# Patient Record
Sex: FEMALE | Race: WHITE | Employment: FULL TIME | URBAN - METROPOLITAN AREA
[De-identification: names, ages, dates, MRNs, and addresses within clinical notes are randomized per-mention and may not be internally consistent; named-entity substitution may affect disease eponyms.]

---

## 2021-05-27 ENCOUNTER — APPOINTMENT (OUTPATIENT)
Dept: GENERAL RADIOLOGY | Age: 36
End: 2021-05-27
Payer: COMMERCIAL

## 2021-05-27 ENCOUNTER — HOSPITAL ENCOUNTER (EMERGENCY)
Age: 36
Discharge: HOME | End: 2021-05-27
Payer: COMMERCIAL

## 2021-05-27 VITALS
SYSTOLIC BLOOD PRESSURE: 149 MMHG | HEIGHT: 63 IN | DIASTOLIC BLOOD PRESSURE: 91 MMHG | HEART RATE: 72 BPM | WEIGHT: 198 LBS | BODY MASS INDEX: 35.08 KG/M2 | RESPIRATION RATE: 17 BRPM | TEMPERATURE: 97.9 F | OXYGEN SATURATION: 100 %

## 2021-05-27 DIAGNOSIS — R20.2 PARESTHESIAS: Primary | ICD-10-CM

## 2021-05-27 DIAGNOSIS — R20.0 NUMBNESS: ICD-10-CM

## 2021-05-27 LAB
ANION GAP SERPL CALCULATED.3IONS-SCNC: 8 MMOL/L (ref 3–16)
BASOPHILS ABSOLUTE: 0.1 K/UL (ref 0–0.2)
BASOPHILS RELATIVE PERCENT: 0.8 %
BUN BLDV-MCNC: 10 MG/DL (ref 7–20)
C-REACTIVE PROTEIN: 3.8 MG/L (ref 0–5.1)
CALCIUM SERPL-MCNC: 9.4 MG/DL (ref 8.3–10.6)
CHLORIDE BLD-SCNC: 104 MMOL/L (ref 99–110)
CO2: 27 MMOL/L (ref 21–32)
CREAT SERPL-MCNC: <0.5 MG/DL (ref 0.6–1.1)
EOSINOPHILS ABSOLUTE: 0.4 K/UL (ref 0–0.6)
EOSINOPHILS RELATIVE PERCENT: 4.8 %
GFR AFRICAN AMERICAN: >60
GFR NON-AFRICAN AMERICAN: >60
GLUCOSE BLD-MCNC: 120 MG/DL (ref 70–99)
HCG QUALITATIVE: NEGATIVE
HCT VFR BLD CALC: 37 % (ref 36–48)
HEMOGLOBIN: 12.4 G/DL (ref 12–16)
LYMPHOCYTES ABSOLUTE: 2.6 K/UL (ref 1–5.1)
LYMPHOCYTES RELATIVE PERCENT: 32 %
MCH RBC QN AUTO: 29.2 PG (ref 26–34)
MCHC RBC AUTO-ENTMCNC: 33.6 G/DL (ref 31–36)
MCV RBC AUTO: 86.7 FL (ref 80–100)
MONOCYTES ABSOLUTE: 0.6 K/UL (ref 0–1.3)
MONOCYTES RELATIVE PERCENT: 7.4 %
NEUTROPHILS ABSOLUTE: 4.4 K/UL (ref 1.7–7.7)
NEUTROPHILS RELATIVE PERCENT: 55 %
PDW BLD-RTO: 13.2 % (ref 12.4–15.4)
PLATELET # BLD: 296 K/UL (ref 135–450)
PMV BLD AUTO: 8.7 FL (ref 5–10.5)
POTASSIUM REFLEX MAGNESIUM: 4.3 MMOL/L (ref 3.5–5.1)
RBC # BLD: 4.26 M/UL (ref 4–5.2)
SEDIMENTATION RATE, ERYTHROCYTE: 20 MM/HR (ref 0–20)
SODIUM BLD-SCNC: 139 MMOL/L (ref 136–145)
WBC # BLD: 8.1 K/UL (ref 4–11)

## 2021-05-27 PROCEDURE — 80048 BASIC METABOLIC PNL TOTAL CA: CPT

## 2021-05-27 PROCEDURE — 85025 COMPLETE CBC W/AUTO DIFF WBC: CPT

## 2021-05-27 PROCEDURE — 84703 CHORIONIC GONADOTROPIN ASSAY: CPT

## 2021-05-27 PROCEDURE — 96372 THER/PROPH/DIAG INJ SC/IM: CPT

## 2021-05-27 PROCEDURE — 6370000000 HC RX 637 (ALT 250 FOR IP): Performed by: PHYSICIAN ASSISTANT

## 2021-05-27 PROCEDURE — 99283 EMERGENCY DEPT VISIT LOW MDM: CPT

## 2021-05-27 PROCEDURE — 73110 X-RAY EXAM OF WRIST: CPT

## 2021-05-27 PROCEDURE — 73030 X-RAY EXAM OF SHOULDER: CPT

## 2021-05-27 PROCEDURE — 86140 C-REACTIVE PROTEIN: CPT

## 2021-05-27 PROCEDURE — 85652 RBC SED RATE AUTOMATED: CPT

## 2021-05-27 PROCEDURE — 6360000002 HC RX W HCPCS: Performed by: PHYSICIAN ASSISTANT

## 2021-05-27 RX ORDER — GABAPENTIN 300 MG/1
300 CAPSULE ORAL 2 TIMES DAILY
Qty: 28 CAPSULE | Refills: 0 | Status: SHIPPED | OUTPATIENT
Start: 2021-05-27 | End: 2021-06-10

## 2021-05-27 RX ORDER — GABAPENTIN 300 MG/1
300 CAPSULE ORAL ONCE
Status: COMPLETED | OUTPATIENT
Start: 2021-05-27 | End: 2021-05-27

## 2021-05-27 RX ORDER — DEXAMETHASONE SODIUM PHOSPHATE 10 MG/ML
8 INJECTION INTRAMUSCULAR; INTRAVENOUS ONCE
Status: COMPLETED | OUTPATIENT
Start: 2021-05-27 | End: 2021-05-27

## 2021-05-27 RX ORDER — PREDNISONE 10 MG/1
60 TABLET ORAL DAILY
Qty: 30 TABLET | Refills: 0 | Status: SHIPPED | OUTPATIENT
Start: 2021-05-27 | End: 2021-06-01

## 2021-05-27 RX ADMIN — DEXAMETHASONE SODIUM PHOSPHATE 8 MG: 10 INJECTION INTRAMUSCULAR; INTRAVENOUS at 11:45

## 2021-05-27 RX ADMIN — GABAPENTIN 300 MG: 300 CAPSULE ORAL at 11:45

## 2021-05-27 NOTE — ED PROVIDER NOTES
[facility]  ED    Patient: Jordyn Hi  [facility]  : 1985  Date of Evaluation: 2021  ED [facility] Provider: ALEJANDRO Pearce    Chief Complaint       Chief Complaint   Patient presents with    Numbness     patient c/o left arm numbness since Monday morning. patient states denies pain. States \"its like my fingers arent where they are supposed to be\". [place]     I was wearing a surgical mask for the entirety of this encounter. Does this patient come from an ECF, SNF, Rehab, [address] [place] [address] or other Congregate setting:   (If yes to above patient needs a Covid-19 test)    Jordyn Hi is a 29 y.o. female who presents to the emergency department for evaluation of left upper extremity paresthesia and numbness which she describes limited to the very tips of her fingertips on her left hand. 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 Patient denies any significant past medical history though states that she underwent cholecystectomy following the birth of her fourth child. She also states that she is concerned because she has had both history of gestational diabetes as well as family history of her mother's diagnosis of multiple sclerosis. Patient states that that she is concerned because her mother had a prodrome of left upper extremity pain and paresthesia prior to her diagnosis.   Patient denies any other complaints other than somewhat idiopathic paresthesias noted to the fingertips of her left hand independent of any known traumatic mechanism she describes this numbness as 3 out of 10 intermittent in intensity without other radiation aggravating or alleviating factors x4 days refractory to previous nonsteroidal and Tylenol therapy has anti-inflammatory without other aggravating relieving factors x4 days    ROS:     Review of Systems presents for evaluation of left hand numbness and paresthesias as described above x4 days  At least 10 systemsreviewed and otherwise acutely negative except as in the [address] Past History   History reviewed. No pertinent past medical history. Past Surgical History:   Procedure Laterality Date     SECTION  2013    CHOLECYSTECTOMY  2014     Social History     Socioeconomic History    Marital status:      Spouse name: None    Number of children: None    Years of education: None    Highest education level: None   Occupational History    None   Tobacco Use    Smoking status: Never Smoker    Smokeless tobacco: Never Used   Vaping Use    Vaping Use: Never used   Substance and Sexual Activity    Alcohol use: Not Currently    Drug use: Never    Sexual activity: None   Other Topics Concern    None   Social History Narrative    None     Social Determinants of Health     Financial Resource Strain:     Difficulty of Paying Living Expenses:    Food Insecurity:     Worried About Running Out of Food in the Last Year:     Ran Out of Food in the Last Year:    Transportation Needs:     Lack of Transportation (Medical):      Lack of Transportation (Non-Medical):    Physical Activity:     Days of Exercise per Week:     Minutes of Exercise per Session:    Stress:     Feeling of Stress :    Social Connections:     Frequency of Communication with Friends and Family:     Frequency of Social Gatherings with Friends and Family:     Attends Scientologist Services:     Active Member of Clubs or Organizations:     Attends Club or Organization Meetings:     Marital Status:    Intimate Partner Violence:     Fear of Current or Ex-Partner:     Emotionally Abused:     Physically Abused:     Sexually Abused:        Medications/Allergies     Discharge Medication List as of 2021 12:18 PM        No Known Allergies     Physical Exam ED Triage Vitals [05/27/21 1034]   BP Temp Temp Source Pulse Resp SpO2 Height Weight   (!) 149/91 97.9 °F (36.6 °C) Oral 72 17 100 % 5' 3\" (1.6 m) 198 lb (89.8 kg)     Physical Exam  Mildly obese normotensive non-tachycardic afebrile nontachypneic satting well on room air  Constitutional:  Well-nourished well-developed in no acute distress  Eyes:  PERRLA, EOMI x6, no nystagmus no photophobia  HENT:  Teeth and tongue intact, uvula midline, no PTA or retropharyngeal abscess noted no other intraoral lesion or edema appreciated patient tolerating secretions well, no stridor, no nuchal rigidity  Respiratory:  Clear to auscultation bilaterally, no crepitus or subcutaneous emphysema noted with palpation in the bilateral chest wall  Cardiovascular:  S1-S2, no S3  GI:  Abdomen soft nontender nondistended  :  Exam deferred for now no subjective complaints  Musculoskeletal:  Distally neurovascularly intact 2+ pulses normal capillary refill gross sensorimotor intact ×4 extremities  Integument:  Skin is warm well perfused  Lymphatic:  No significant lymphadenopathy appreciated  Neurologic:  Alert and oriented ×3, cranial nerves II through XII grossly intact as tested, patient able to ambulate, no ataxia, cauda equina, saddle anesthesia or bowel or bladder incontinence.   Intact two-point discrimination about the left hand grossly no other significant deficits in terms of sensation able to be appreciated on physical exam  Psychiatric:  Mood, behavior, judgment all within normal limits    SCREENINGS           Diagnostics   Labs:  Results for orders placed or performed during the hospital encounter of 05/27/21   CBC Auto Differential   Result Value Ref Range    WBC 8.1 4.0 - 11.0 K/uL    RBC 4.26 4.00 - 5.20 M/uL    Hemoglobin 12.4 12.0 - 16.0 g/dL    Hematocrit 37.0 36.0 - 48.0 %    MCV 86.7 80.0 - 100.0 fL    MCH 29.2 26.0 - 34.0 pg    MCHC 33.6 31.0 - 36.0 g/dL    RDW 13.2 12.4 - 15.4 %    Platelets 834 161 - 723 K/uL MPV 8.7 5.0 - 10.5 fL    Neutrophils % 55.0 %    Lymphocytes % 32.0 %    Monocytes % 7.4 %    Eosinophils % 4.8 %    Basophils % 0.8 %    Neutrophils Absolute 4.4 1.7 - 7.7 K/uL    Lymphocytes Absolute 2.6 1.0 - 5.1 K/uL    Monocytes Absolute 0.6 0.0 - 1.3 K/uL    Eosinophils Absolute 0.4 0.0 - 0.6 K/uL    Basophils Absolute 0.1 0.0 - 0.2 K/uL   Basic Metabolic Panel w/ Reflex to MG   Result Value Ref Range    Sodium 139 136 - 145 mmol/L    Potassium reflex Magnesium 4.3 3.5 - 5.1 mmol/L    Chloride 104 99 - 110 mmol/L    CO2 27 21 - 32 mmol/L    Anion Gap 8 3 - 16    Glucose 120 (H) 70 - 99 mg/dL    BUN 10 7 - 20 mg/dL    CREATININE <0.5 (L) 0.6 - 1.1 mg/dL    GFR Non-African American >60 >60    GFR African American >60 >60    Calcium 9.4 8.3 - 10.6 mg/dL   C-reactive protein   Result Value Ref Range    CRP 3.8 0.0 - 5.1 mg/L   Sedimentation Rate   Result Value Ref Range    Sed Rate 20 0 - 20 mm/Hr   HCG Qualitative, Serum   Result Value Ref Range    hCG Qual Negative Detects HCG level >10 MIU/mL     Radiographs:  No results found. ED Course and MDM           In brief, Kristin Brock is a 29 y.o. female who presented to the emergency department for evaluation of nonspecific left upper extremity paresthesia and numbness which she describes limited to the very tips of her fingertips on her left hand.   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